# Patient Record
Sex: FEMALE | Race: WHITE | NOT HISPANIC OR LATINO | URBAN - METROPOLITAN AREA
[De-identification: names, ages, dates, MRNs, and addresses within clinical notes are randomized per-mention and may not be internally consistent; named-entity substitution may affect disease eponyms.]

---

## 2024-09-16 ENCOUNTER — EMERGENCY (EMERGENCY)
Facility: HOSPITAL | Age: 59
LOS: 0 days | Discharge: HOME | End: 2024-09-16
Attending: STUDENT IN AN ORGANIZED HEALTH CARE EDUCATION/TRAINING PROGRAM
Payer: MEDICAID

## 2024-09-16 VITALS
TEMPERATURE: 98 F | OXYGEN SATURATION: 99 % | SYSTOLIC BLOOD PRESSURE: 154 MMHG | DIASTOLIC BLOOD PRESSURE: 108 MMHG | HEART RATE: 89 BPM | RESPIRATION RATE: 19 BRPM

## 2024-09-16 VITALS
HEART RATE: 67 BPM | OXYGEN SATURATION: 98 % | SYSTOLIC BLOOD PRESSURE: 150 MMHG | TEMPERATURE: 98 F | RESPIRATION RATE: 18 BRPM | DIASTOLIC BLOOD PRESSURE: 78 MMHG

## 2024-09-16 DIAGNOSIS — S60.812A ABRASION OF LEFT WRIST, INITIAL ENCOUNTER: ICD-10-CM
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
## 2024-09-16 DIAGNOSIS — Z23 ENCOUNTER FOR IMMUNIZATION: ICD-10-CM
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
## 2024-09-16 DIAGNOSIS — S22.32XA FRACTURE OF ONE RIB, LEFT SIDE, INITIAL ENCOUNTER FOR CLOSED FRACTURE: ICD-10-CM

## 2024-09-16 DIAGNOSIS — S60.511A ABRASION OF RIGHT HAND, INITIAL ENCOUNTER: ICD-10-CM

## 2024-09-16 DIAGNOSIS — R22.0 LOCALIZED SWELLING, MASS AND LUMP, HEAD: ICD-10-CM

## 2024-09-16 DIAGNOSIS — I10 ESSENTIAL (PRIMARY) HYPERTENSION: ICD-10-CM

## 2024-09-16 DIAGNOSIS — S80.211A ABRASION, RIGHT KNEE, INITIAL ENCOUNTER: ICD-10-CM

## 2024-09-16 DIAGNOSIS — S00.83XA CONTUSION OF OTHER PART OF HEAD, INITIAL ENCOUNTER: ICD-10-CM

## 2024-09-16 DIAGNOSIS — S30.811A ABRASION OF ABDOMINAL WALL, INITIAL ENCOUNTER: ICD-10-CM

## 2024-09-16 PROCEDURE — 70486 CT MAXILLOFACIAL W/O DYE: CPT | Mod: 26,MC

## 2024-09-16 PROCEDURE — 70450 CT HEAD/BRAIN W/O DYE: CPT | Mod: 26,MC

## 2024-09-16 PROCEDURE — 73564 X-RAY EXAM KNEE 4 OR MORE: CPT | Mod: RT

## 2024-09-16 PROCEDURE — 73130 X-RAY EXAM OF HAND: CPT | Mod: RT

## 2024-09-16 PROCEDURE — 74176 CT ABD & PELVIS W/O CONTRAST: CPT | Mod: 26,MC

## 2024-09-16 PROCEDURE — 90715 TDAP VACCINE 7 YRS/> IM: CPT

## 2024-09-16 PROCEDURE — 72125 CT NECK SPINE W/O DYE: CPT | Mod: 26,MC

## 2024-09-16 PROCEDURE — 71250 CT THORAX DX C-: CPT | Mod: MC

## 2024-09-16 PROCEDURE — 76376 3D RENDER W/INTRP POSTPROCES: CPT | Mod: 26

## 2024-09-16 PROCEDURE — 73130 X-RAY EXAM OF HAND: CPT | Mod: 26,LT

## 2024-09-16 PROCEDURE — 73110 X-RAY EXAM OF WRIST: CPT | Mod: LT

## 2024-09-16 PROCEDURE — 71250 CT THORAX DX C-: CPT | Mod: 26,MC

## 2024-09-16 PROCEDURE — 73564 X-RAY EXAM KNEE 4 OR MORE: CPT | Mod: 26,RT

## 2024-09-16 PROCEDURE — 99284 EMERGENCY DEPT VISIT MOD MDM: CPT

## 2024-09-16 PROCEDURE — 99284 EMERGENCY DEPT VISIT MOD MDM: CPT | Mod: 25

## 2024-09-16 PROCEDURE — 70486 CT MAXILLOFACIAL W/O DYE: CPT | Mod: MC

## 2024-09-16 PROCEDURE — 74176 CT ABD & PELVIS W/O CONTRAST: CPT | Mod: MC

## 2024-09-16 PROCEDURE — 90471 IMMUNIZATION ADMIN: CPT

## 2024-09-16 PROCEDURE — 72125 CT NECK SPINE W/O DYE: CPT | Mod: MC

## 2024-09-16 PROCEDURE — 73110 X-RAY EXAM OF WRIST: CPT | Mod: 26,LT

## 2024-09-16 PROCEDURE — 76376 3D RENDER W/INTRP POSTPROCES: CPT

## 2024-09-16 PROCEDURE — 70450 CT HEAD/BRAIN W/O DYE: CPT | Mod: MC

## 2024-09-16 RX ORDER — TETANUS TOXOID, REDUCED DIPHTHERIA TOXOID AND ACELLULAR PERTUSSIS VACCINE, ADSORBED 5; 2.5; 8; 8; 2.5 [IU]/.5ML; [IU]/.5ML; UG/.5ML; UG/.5ML; UG/.5ML
0.5 SUSPENSION INTRAMUSCULAR ONCE
Refills: 0 | Status: COMPLETED | OUTPATIENT
Start: 2024-09-16 | End: 2024-09-16

## 2024-09-16 RX ORDER — BACITRACIN 500 UNIT/G
1 OINTMENT (GRAM) TOPICAL ONCE
Refills: 0 | Status: COMPLETED | OUTPATIENT
Start: 2024-09-16 | End: 2024-09-16

## 2024-09-16 RX ADMIN — Medication 1 APPLICATION(S): at 11:26

## 2024-09-16 RX ADMIN — TETANUS TOXOID, REDUCED DIPHTHERIA TOXOID AND ACELLULAR PERTUSSIS VACCINE, ADSORBED 0.5 MILLILITER(S): 5; 2.5; 8; 8; 2.5 SUSPENSION INTRAMUSCULAR at 09:38

## 2024-09-16 NOTE — ED PROVIDER NOTE - PROGRESS NOTE DETAILS
CT head face neck negative for any acute findings.  CT chest and abdomen notable for left ninth rib fracture.  Patient given Percocet for pain pending x-rays of the hands and wrists X-rays negative for fractures.  Patient informed of broken rib. informed to take Tylenol ibuprofen at home.  R given instructions with the [facility]  via phone.  Stable for discharge

## 2024-09-16 NOTE — ED PROVIDER NOTE - PATIENT PORTAL LINK FT
You can access the FollowMyHealth Patient Portal offered by [facility] by registering at the following website: [email or web address]. By joining [facility]’s FollowMyHealth portal, you will also be able to view your health information using other applications (apps) compatible with our system.

## 2024-09-16 NOTE — ED PROVIDER NOTE - CLINICAL SUMMARY MEDICAL DECISION MAKING FREE TEXT BOX
59-year-old female with a history of hypertension presenting with a mechanical fall.  Patient reporting pain to face and left flank.  Patient states she was walking with a purse and phone in her hand when she tripped and was unable to catch herself.  Denies LOC, unclear if on AC patient reports taking a medication as needed for vein problems medication was prescribed in [place].  Review of systems otherwise negative    Vital signs reviewed  Exam notable for superficial abrasions to the bilateral hands right knee and a contusion to her left cheekbone.  No focal  deficits.  No spinal tenderness or step-offs.    Trauma workup patient may neuro alert due to unclear history pending CT head face neck dry scan of the CT chest and abdomen.  X-rays of bilateral hands and left wrist ordered will give a tetanus shot.  Percocet for pain.  And reassess

## 2024-09-16 NOTE — ED ADULT NURSE NOTE - NSFALLHARMRISKINTERV_ED_ALL_ED

## 2024-09-16 NOTE — ED ADULT NURSE NOTE - OBJECTIVE STATEMENT
Pt is a 59 year old female who fell today who sustained left head strike, no LOC, not on anticoagulant. Pt with large bruise left forehead, denies visual disturbance, no nausea or vomiting. Multiple abrasions on hands denise. Abrasion left upper abdomen with severe pain , no SOB or CP. Right knee with large abrasion and old bleeding.

## 2024-09-16 NOTE — ED ADULT NURSE NOTE - CHIEF COMPLAINT QUOTE
Pt presents to [facility] complaining of fall. Pt endorses fall this morning + head strike - LOC unknown ACs use. Lacerations noted to L cheekbone R knee and B/L hands.  Pt speaks Bruneian unable to obtain Bruneian  so Angolan  [id number] utilized. NA called by Dr. Pierce.

## 2024-09-16 NOTE — ED PROVIDER NOTE - DISCHARGE DATE
-- DO NOT REPLY / DO NOT REPLY ALL --  -- This inbox is not monitored. If this was sent to the wrong provider or department, reroute message to P [facility] Reroute pool. --  -- Message is from [facility] ([facility]) --      Message Type:  Refill Medication   Refill request for Pended medication named: gabapeentin 100 mg   Preferred pharmacy verified, and selected.   [id number] [place], [place] - [address] [place] [facility]    Is the patient OUT of Medication?  Yes and Medication Refills handled by [facility] Clinical        Message: pt needs medication called into pharmacy for new script  upped the dosage on the gabapentin at last visit 8/12                     16-Sep-2024

## 2024-09-16 NOTE — ED ADULT NURSE NOTE - DOES PATIENT HAVE ADVANCE DIRECTIVE
SUBJECTIVE  HPI Leonard Roland is a 52 year old female who presents today for evaluation of    1, discussion re recent MRI/pelvic sono. She had abnormal finding on MRI and normal sono- she is questioning what to do re followup. Denies any change in pelvic pain or cramping or bleeding or bloating.   2. Has some questions re valium. States she finds them helpful around 2 times/week. She states work is very stressful and difficult currently. She would like around 2/week to help with stress/anxiety.     Current Outpatient Medications   Medication Sig Dispense Refill   • diazePAM (VALIUM) 5 MG tablet diazepam 5 mg tablet   TAKE 1 TABLET BY MOUTH EVERY 6 HOURS AS NEEDED     • diazePAM (VALIUM) 5 MG tablet Take 1 tablet by mouth every 6 hours as needed for Anxiety. 8 tablet 3   • levothyroxine 112 MCG tablet TAKE 1 TABLET BY MOUTH DAILY 90 tablet 11     No current facility-administered medications for this visit.       Social History     Socioeconomic History   • Marital status: Single     Spouse name: Not on file   • Number of children: Not on file   • Years of education: Not on file   • Highest education level: Not on file   Occupational History   • Not on file   Social Needs   • Financial resource strain: Not on file   • Food insecurity     Worry: Not on file     Inability: Not on file   • Transportation needs     Medical: Not on file     Non-medical: Not on file   Tobacco Use   • Smoking status: Never Smoker   • Smokeless tobacco: Never Used   Substance and Sexual Activity   • Alcohol use: Yes   • Drug use: No   • Sexual activity: Yes     Birth control/protection: None   Lifestyle   • Physical activity     Days per week: 4 days     Minutes per session: 130 min   • Stress: Not at all   Relationships   • Social connections     Talks on phone: Not on file     Gets together: Not on file     Attends Taoist service: Not on file     Active member of club or organization: Not on file     Attends meetings of clubs or  organizations: Not on file     Relationship status: Not on file   • Intimate partner violence     Fear of current or ex partner: Not on file     Emotionally abused: Not on file     Physically abused: Not on file     Forced sexual activity: Not on file   Other Topics Concern   • Not on file   Social History Narrative   • Not on file     No past medical history on file.  Past Surgical History:   Procedure Laterality Date   • Back surgery  2020   •  section, classic        Family History   Problem Relation Age of Onset   • Stroke Mother    • Heart disease Father         Patient's medications, allergies, past medical, surgical, social and family histories were reviewed and updated as appropriate.  Current Outpatient Medications   Medication Sig Dispense Refill   • diazePAM (VALIUM) 5 MG tablet diazepam 5 mg tablet   TAKE 1 TABLET BY MOUTH EVERY 6 HOURS AS NEEDED     • diazePAM (VALIUM) 5 MG tablet Take 1 tablet by mouth every 6 hours as needed for Anxiety. 8 tablet 3   • levothyroxine 112 MCG tablet TAKE 1 TABLET BY MOUTH DAILY 90 tablet 11     No current facility-administered medications for this visit.      Review of Systems   Constitutional: Negative for activity change, appetite change and unexpected weight change.   HENT: Negative for congestion, ear pain and rhinorrhea.    Eyes: Negative for discharge.   Respiratory: Negative for cough, shortness of breath and wheezing.    Cardiovascular: Negative for chest pain, palpitations and leg swelling.   Gastrointestinal: Negative for abdominal pain, constipation, diarrhea, nausea and vomiting.   Endocrine: Negative for polydipsia and polyuria.   Genitourinary: Negative for difficulty urinating and dysuria.   Musculoskeletal: Negative for gait problem.   Skin: Negative for color change and rash.   Neurological: Negative for dizziness, light-headedness and headaches.   Hematological: Negative for adenopathy.   Psychiatric/Behavioral: Negative for agitation and  sleep disturbance. The patient is not nervous/anxious.          Vitals: /74 (BP Location: LUE - Left upper extremity, Patient Position: Sitting)   Pulse 96   Temp 98.2 °F (36.8 °C)   Resp 16   Ht 5' 4\" (1.626 m)   Wt 54.7 kg (120 lb 9.5 oz)   LMP 02/21/2021 (Exact Date)   BMI 20.70 kg/m²   BSA 1.58 m²       Physical Exam  Constitutional:       Appearance: She is well-developed.   HENT:      Head: Normocephalic and atraumatic.      Right Ear: External ear normal.      Left Ear: External ear normal.      Nose: Nose normal.   Eyes:      General:         Right eye: No discharge.         Left eye: No discharge.      Conjunctiva/sclera: Conjunctivae normal.      Pupils: Pupils are equal, round, and reactive to light.   Neck:      Musculoskeletal: Normal range of motion and neck supple.   Cardiovascular:      Rate and Rhythm: Normal rate and regular rhythm.      Heart sounds: Normal heart sounds. No murmur.   Pulmonary:      Effort: Pulmonary effort is normal. No respiratory distress.      Breath sounds: Normal breath sounds. No wheezing or rales.   Abdominal:      General: Bowel sounds are normal. There is no distension.      Palpations: Abdomen is soft. There is no mass.      Tenderness: There is no abdominal tenderness. There is no guarding or rebound.      Hernia: No hernia is present.   Musculoskeletal: Normal range of motion.         General: No deformity.   Lymphadenopathy:      Cervical: No cervical adenopathy.   Skin:     General: Skin is warm and dry.      Capillary Refill: Capillary refill takes less than 2 seconds.      Findings: No rash.   Neurological:      Mental Status: She is alert and oriented to person, place, and time.      Cranial Nerves: No cranial nerve deficit.      Coordination: Coordination normal.      Deep Tendon Reflexes: Reflexes normal.   Psychiatric:         Behavior: Behavior normal.         Thought Content: Thought content normal.         Judgment: Judgment normal.          Assessment   Problem List Items Addressed This Visit        Endocrine    Hypothyroidism  Please continue current medication and treatment plan as discussed. Any appropriate labs/xray  or prior labs/xray reviewed.       Hyperlipidemia  Lipids-  Encouraged patient compliance with current medication. Appropriate medication refills completed. Diet reviewed and encouraged. Regular exercise encouraged.   I personally reviewed and analyzed each of the labs and images within this note and prior appropriate labs and images from prior visits...'         Other    Abnormal MRI - Primary    Relevant Orders    MRI PELVIS W WO CONTRAST    BUN/CREATININE RATIO    CBC WITH DIFFERENTIAL    - encoruaged pt to have pelvic MRI per radiology request. Discussed at length with pt, she agrees.         I spent 30 minutes of total time on date of encounter.  This includes reviewing the patient's chart, obtaining history, performing an exam, counseling the patient, coordinating care and documenting clinical information int the EMR.       Follow up with me in 3 months  No additional care support required at this time    I personally reviewed and analyzed each of the labs and images within this note.    Discussed treatment options, plan with patient and all questions answered. Patient verbalized understanding and agreement with plan.     The patient was instructed to call if any questions or problems about her concerns addressed.      No

## 2024-09-16 NOTE — ED ADULT TRIAGE NOTE - CHIEF COMPLAINT QUOTE
Pt presents to [facility] complaining of fall. Pt endorses fall this morning + head strike - LOC unknown ACs use. Pt speaks Japanese unable to obtain Japanese  so Citizen of the [place]  [id number] utilized. NA called by Dr. Pierce. Pt presents to [facility] complaining of fall. Pt endorses fall this morning + head strike - LOC unknown ACs use. Lacerations noted to L cheekbone R knee and B/L hands.  Pt speaks Chadian unable to obtain Chadian  so Moldovan  [id number] utilized. NA called by Dr. Pierce.

## 2024-09-16 NOTE — ED PROVIDER NOTE - NSFOLLOWUPINSTRUCTIONS_ED_ALL_ED_FT
Please take Tylenol 650 mg every 6 hours OR  ibuprofen 600 mg every 6 hours as needed for pain.   Your  CT of the head neck chest and abdomen were negative for any acute abnormalities.  Your x-rays of the hand wrist and knee were negative for any acute abnormalities.    You can apply bacitracin to the wounds twice a day.  keep wounds clean,  washed with warm water and gentle soap.    Return to the Emergency Department for worsening or persistent symptoms, and/or ANY NEW OR CONCERNING SYMPTOMS. If you have issues obtaining follow up, please call: [phone] or [phone]  to obtain a doctor or specialist who takes your insurance in your area.

## 2024-09-16 NOTE — ED PROVIDER NOTE - PHYSICAL EXAMINATION
Constitutional: NAD AAOx3  Eyes: PERRLA EOMI  Head: swelling to left cheekbone  ENT: No hughes sign, no raccoon eyes, no hemotympanum, no csf rhinorrhea, no nasal septal hematoma  Mouth: MMM  Cardiac: regular rate   Resp: unlabored breathing, b/l breath founds  GI: Abd s/nt/nd  Neuro: grossly normal and intact GCS 15 no neuro deficits  Skin: No rashes, no bruising to chest, back, abdomen or extremities.  superficial abrasions to left flank, right knee, right  dorsal hand,  left hand and wrist  Msk: No midline spinal ttp, full ROM of neck,  no ttp of facial bones, no ttp to chest wall, pelvis stable, full ROM of all extremities without any ttp of extremities Constitutional: NAD AAOx3  Eyes: PERRLA EOMI  Head: swelling to left cheekbone  ENT: No hughes sign, no raccoon eyes, no hemotympanum, no csf rhinorrhea, no nasal septal hematoma  Mouth: MMM  Cardiac: regular rate   Resp: unlabored breathing, b/l breath founds  GI: Abd s/nt/nd  Neuro: grossly normal and intact GCS 15 no neuro deficits  Skin: No rashes, no bruising to chest, back, abdomen or extremities.  + superficial abrasions to left flank, right knee, right  dorsal hand,  left hand and wrist  Msk: No midline spinal ttp, full ROM of neck,  no ttp of facial bones, no ttp to chest wall, pelvis stable, full ROM of all extremities without any ttp of extremities